# Patient Record
Sex: MALE | URBAN - METROPOLITAN AREA
[De-identification: names, ages, dates, MRNs, and addresses within clinical notes are randomized per-mention and may not be internally consistent; named-entity substitution may affect disease eponyms.]

---

## 2020-06-16 ENCOUNTER — OFFICE VISIT (OUTPATIENT)
Dept: URBAN - METROPOLITAN AREA CLINIC 11 | Facility: CLINIC | Age: 25
End: 2020-06-16
Payer: COMMERCIAL

## 2020-06-16 DIAGNOSIS — S05.12XA CONTUSION OF EYEBALL AND ORBITAL TISSUES, LEFT EYE, INIT: ICD-10-CM

## 2020-06-16 DIAGNOSIS — S05.02XA CORNEAL ABRASION W/O FB OF LEFT EYE, INITIAL ENCOUNTER: Primary | ICD-10-CM

## 2020-06-16 PROCEDURE — 92004 COMPRE OPH EXAM NEW PT 1/>: CPT | Performed by: OPTOMETRIST

## 2020-06-16 RX ORDER — TOBRAMYCIN AND DEXAMETHASONE 3; 1 MG/ML; MG/ML
SUSPENSION/ DROPS OPHTHALMIC
Qty: 1 | Refills: 0 | Status: INACTIVE
Start: 2020-06-16 | End: 2020-06-16

## 2020-06-16 RX ORDER — PREDNISOLONE ACETATE 10 MG/ML
1 % SUSPENSION/ DROPS OPHTHALMIC
Qty: 1 | Refills: 0 | Status: ACTIVE
Start: 2020-06-16

## 2020-06-16 ASSESSMENT — INTRAOCULAR PRESSURE
OS: 16
OD: 21

## 2020-06-16 NOTE — IMPRESSION/PLAN
Impression: Contusion of eyeball and orbital tissues, left eye, init: S05.12xA.  Plan: see plan above

## 2020-06-16 NOTE — IMPRESSION/PLAN
Impression: Corneal abrasion w/o FB of left eye, initial encounter: S05.02xA.  - nearly healed Plan: Start Prednisolone qid OS and continue ofloxacin qid OS

## 2020-06-24 ENCOUNTER — OFFICE VISIT (OUTPATIENT)
Dept: URBAN - METROPOLITAN AREA CLINIC 11 | Facility: CLINIC | Age: 25
End: 2020-06-24
Payer: COMMERCIAL

## 2020-06-24 DIAGNOSIS — S05.02XD CORNEAL ABRASION W/O FB OF LEFT EYE, SUBSEQUENT ENCOUNTER: Primary | ICD-10-CM

## 2020-06-24 PROCEDURE — 99213 OFFICE O/P EST LOW 20 MIN: CPT | Performed by: OPTOMETRIST

## 2020-06-24 ASSESSMENT — INTRAOCULAR PRESSURE: OS: 13

## 2020-06-24 NOTE — IMPRESSION/PLAN
Impression: Corneal abrasion w/o FB of left eye, subsequent encounter: S05.02xD. Condition: resolved. Plan: D/c Pred and Oflox.  Monitor